# Patient Record
Sex: FEMALE | Race: WHITE | URBAN - METROPOLITAN AREA
[De-identification: names, ages, dates, MRNs, and addresses within clinical notes are randomized per-mention and may not be internally consistent; named-entity substitution may affect disease eponyms.]

---

## 2017-09-27 ENCOUNTER — MYC REFILL (OUTPATIENT)
Dept: DERMATOLOGY | Facility: CLINIC | Age: 27
End: 2017-09-27

## 2017-09-27 DIAGNOSIS — L70.0 CYSTIC ACNE: ICD-10-CM

## 2017-09-27 DIAGNOSIS — L70.0 ACNE VULGARIS: ICD-10-CM

## 2017-09-28 RX ORDER — DAPSONE 50 MG/G
GEL TOPICAL EVERY MORNING
Qty: 60 G | Refills: 1 | Status: SHIPPED | OUTPATIENT
Start: 2017-09-28

## 2017-09-28 RX ORDER — TRETINOIN 0.25 MG/G
CREAM TOPICAL
Qty: 45 G | Refills: 1 | Status: SHIPPED | OUTPATIENT
Start: 2017-09-28

## 2017-09-28 NOTE — TELEPHONE ENCOUNTER
Message from [place]ges:  Mi Leon CMA Thu Sep 28, 2017 8:26 AM        ----- Message -----   From: Prabhakar Toussaint   Sent: 9/27/2017 10:36 PM   To: Derm Triage-[facility]  Subject: Medication Renewal Request     Original authorizing provider: SARAVANAN Teran would like a refill of the following medications:  tretinoin (RETIN-A) 0.025 % cream [Tiana Estrada PA-C]  Dapsone 5 % GEL [Tiana Estrada PA-C]    Preferred pharmacy: [facility] [facility], [place] - [address] [place] [facility]    Comment:

## 2018-07-11 ENCOUNTER — OFFICE VISIT (OUTPATIENT)
Dept: OPHTHALMOLOGY | Facility: CLINIC | Age: 28
End: 2018-07-11
Payer: COMMERCIAL

## 2018-07-11 DIAGNOSIS — H52.03 HYPERMETROPIA OF BOTH EYES: Primary | ICD-10-CM

## 2018-07-11 DIAGNOSIS — H10.13 ALLERGIC CONJUNCTIVITIS OF BOTH EYES: ICD-10-CM

## 2018-07-11 DIAGNOSIS — Z98.890 S/P LASIK SURGERY OF BOTH EYES: ICD-10-CM

## 2018-07-11 RX ORDER — OLOPATADINE HYDROCHLORIDE 1 MG/ML
1 SOLUTION/ DROPS OPHTHALMIC 2 TIMES DAILY
Qty: 1 BOTTLE | Refills: 3 | Status: SHIPPED | OUTPATIENT
Start: 2018-07-11 | End: 2018-07-11

## 2018-07-11 RX ORDER — IMIPRAMINE HCL 50 MG
TABLET ORAL
COMMUNITY
Start: 2018-05-25

## 2018-07-11 ASSESSMENT — REFRACTION
OD_CYLINDER: +0.25
OS_AXIS: 082
OD_SPHERE: 0.00
OS_SPHERE: +0.00
OD_AXIS: 093
OS_CYLINDER: +0.50

## 2018-07-11 ASSESSMENT — REFRACTION_MANIFEST
OD_CYLINDER: SPHERE
OS_CYLINDER: SPHERE
OS_SPHERE: +1.50
OS_SPHERE: +0.25
OD_SPHERE: +0.25
OS_CYLINDER: SPHERE
OD_SPHERE: +0.75
OD_CYLINDER: SPHERE

## 2018-07-11 ASSESSMENT — CONF VISUAL FIELD
OD_NORMAL: 1
OS_NORMAL: 1

## 2018-07-11 ASSESSMENT — CUP TO DISC RATIO
OD_RATIO: 0.1
OS_RATIO: 0.1

## 2018-07-11 ASSESSMENT — SLIT LAMP EXAM - LIDS
COMMENTS: NORMAL
COMMENTS: NORMAL

## 2018-07-11 ASSESSMENT — VISUAL ACUITY
OS_SC: 20/20
METHOD: SNELLEN - LINEAR
OD_SC: 20/20
METHOD_MR_RETINOSCOPY: 1

## 2018-07-11 ASSESSMENT — TONOMETRY
OD_IOP_MMHG: 15
OS_IOP_MMHG: 17
IOP_METHOD: ICARE

## 2018-07-11 ASSESSMENT — EXTERNAL EXAM - RIGHT EYE: OD_EXAM: NORMAL

## 2018-07-11 ASSESSMENT — EXTERNAL EXAM - LEFT EYE: OS_EXAM: NORMAL

## 2018-07-11 NOTE — PROGRESS NOTES
Assessment/Plan  (H52.03) Hypermetropia of both eyes  (primary encounter diagnosis)  Comment: Minimal refractive error OU  Plan: REFRACTION [14501]         Educated patient on condition and clinical findings. No spectacle prescription recommended at this time. Monitor annually.    (Z98.890) S/P LASIK surgery of both eyes  Comment: Stable  Plan:  Monitor annually.    (H10.13) Allergic conjunctivitis of both eyes  Comment: Symptomatic with dryness  Plan:  ketotifen (ZADITOR/REFRESH ANTI-ITCH) 0.025 %         SOLN ophthalmic solution        Prescribed ketotifen bid OU x 1 month. Follow-up as needed.    Return to clinic in 1 year for comprehensive eye exam.    Complete documentation of historical and exam elements from today's encounter can  be found in the full encounter summary report (not reduplicated in this progress  note). I personally obtained the chief complaint(s) and history of present illness. I  confirmed and edited as necessary the review of systems, past medical/surgical  history, family history, social history, and examination findings as documented by  others; and I examined the patient myself. I personally reviewed the relevant tests,  images, and reports as documented above. I formulated and edited as necessary the  assessment and plan and discussed the findings and management plan with the  patient and family.    Jorge Madison, ANAIS, FAAO

## 2018-07-11 NOTE — MR AVS SNAPSHOT
After Visit Summary   7/11/2018    Prabhakar Toussaint    MRN: [id number]           Patient Information     Date Of Birth          1990        Visit Information        Provider Department      7/11/2018 11:40 AM Jorge Madison OD [facility]        Today's Diagnoses     Hypermetropia of both eyes    -  1    S/P LASIK surgery of both eyes        Allergic conjunctivitis of both eyes           Follow-ups after your visit        Who to contact     Please call your clinic at [phone] to:    Ask questions about your health    Make or cancel appointments    Discuss your medicines    Learn about your test results    Speak to your doctor            Additional Information About Your Visit        MyChart Information     [employer] gives you secure access to your electronic health record. If you see a primary care provider, you can also send messages to your care team and make appointments. If you have questions, please call your primary care clinic.  If you do not have a primary care provider, please call [phone] and they will assist you.      [facility] is an electronic gateway that provides easy, online access to your medical records. With [facility], you can request a clinic appointment, read your test results, renew a prescription or communicate with your care team.     To access your existing account, please contact your [facility] or call [phone] for assistance.        Care EveryWhere ID     This is your Care EveryWhere ID. This could be used by other organizations to access your [place] medical records  [id number]         Blood Pressure from Last 3 Encounters:   09/14/16 108/70   09/02/16 114/75   04/27/16 114/85    Weight from Last 3 Encounters:   09/14/16 68.2 kg (150 lb 5.7 oz)   09/02/16 68.9 kg (151 lb 14.4 oz)   04/27/16 64.7 kg (142 lb 11.2 oz)              We Performed the Following     REFRACTION [21714]          Today's Medication Changes           These changes are accurate as of 7/11/18 12:17 PM.  If you have any questions, ask your nurse or doctor.               Start taking these medicines.        Dose/Directions    olopatadine 0.1 % ophthalmic solution   Commonly known as:  PATANOL   Used for:  Allergic conjunctivitis of both eyes   Started by:  Jorge Madison, OD        Dose:  1 drop   Place 1 drop into both eyes 2 times daily   Quantity:  1 Bottle   Refills:  3            Where to get your medicines      These medications were sent to [facility] [address] [address] AT [address] [address]     Phone:  [phone]     olopatadine 0.1 % ophthalmic solution                Primary Care Provider Office Phone # Fax #    Antoine Yost [phone]       [address] [place] [address]        Equal Access to Services     [facility]: Hadii mirza flores hadasho Soomaali, waaxda luqadaha, qaybta kaalmada adeegyada, mariajose robles hayyajairan cee patel . So [facility] [place] [phone].    ATENCIÓN: Si habla español, tiene a arshad disposición servicios gratuitos de asistencia lingüística. Llame al [phone].    We comply with applicable federal civil rights laws and [place] laws. We do not discriminate on the basis of race, color, national origin, age, disability, sex, sexual orientation, or gender identity.            Thank you!     Thank you for choosing [facility]  for your care. Our goal is always to provide you with excellent care. Hearing back from our patients is one way we can continue to improve our services. Please take a few minutes to complete the written survey that you may receive in the mail after your visit with us. Thank you!             Your Updated Medication List - Protect others around you: Learn how to safely use, store and throw away your medicines at www.disposemymeds.org.          This list is accurate as of 7/11/18 12:17 PM.  Always use your  most recent med list.                   Brand Name Dispense Instructions for use Diagnosis    Dapsone 5 % Gel     60 g    Externally apply topically every morning    Acne vulgaris, Cystic acne       imipramine 50 MG tablet    TOFRANIL          Multi-vitamin Tabs tablet      Take 1 tablet by mouth daily        olopatadine 0.1 % ophthalmic solution    PATANOL    1 Bottle    Place 1 drop into both eyes 2 times daily    Allergic conjunctivitis of both eyes       tretinoin 0.025 % cream    RETIN-A    45 g    Start every other night, increase to nightly as tolerated for dryness.    Acne vulgaris, Cystic acne

## 2018-07-11 NOTE — NURSING NOTE
"Chief Complaints and History of Present Illnesses   Patient presents with     COMPREHENSIVE EYE EXAM     HPI    Affected eye(s):  Both   Symptoms:     No blurred vision   No floaters   No flashes   Dryness (Comment: s/p LASIK about 5 years ago, [place])         Do you have eye pain now?:  No      Comments:    Patient note that she sometimes has some issues in the RE, \"like muscle issues, when I go to the chiropracter it releives the tension in the right eye\"    Jossy Arreguin July 11, 2018 11:43 AM               "

## 2020-03-10 ENCOUNTER — HEALTH MAINTENANCE LETTER (OUTPATIENT)
Age: 30
End: 2020-03-10

## 2020-12-27 ENCOUNTER — HEALTH MAINTENANCE LETTER (OUTPATIENT)
Age: 30
End: 2020-12-27

## 2021-04-24 ENCOUNTER — HEALTH MAINTENANCE LETTER (OUTPATIENT)
Age: 31
End: 2021-04-24

## 2021-10-09 ENCOUNTER — HEALTH MAINTENANCE LETTER (OUTPATIENT)
Age: 31
End: 2021-10-09

## 2022-05-16 ENCOUNTER — HEALTH MAINTENANCE LETTER (OUTPATIENT)
Age: 32
End: 2022-05-16

## 2022-09-11 ENCOUNTER — HEALTH MAINTENANCE LETTER (OUTPATIENT)
Age: 32
End: 2022-09-11

## 2023-06-03 ENCOUNTER — HEALTH MAINTENANCE LETTER (OUTPATIENT)
Age: 33
End: 2023-06-03